# Patient Record
Sex: MALE | Race: BLACK OR AFRICAN AMERICAN | Employment: FULL TIME | URBAN - METROPOLITAN AREA
[De-identification: names, ages, dates, MRNs, and addresses within clinical notes are randomized per-mention and may not be internally consistent; named-entity substitution may affect disease eponyms.]

---

## 2017-09-15 ENCOUNTER — HOSPITAL ENCOUNTER (OUTPATIENT)
Dept: CT IMAGING | Age: 22
Discharge: HOME | End: 2017-09-15
Attending: NEUROLOGICAL SURGERY | Admitting: NEUROLOGICAL SURGERY

## 2017-09-15 DIAGNOSIS — S06.4XAA HEMORRHAGE INTO EXTRADURAL SPACE OF NEURAXIS: ICD-10-CM

## 2017-09-15 DIAGNOSIS — I62.00 NONTRAUMATIC SUBDURAL HEMORRHAGE (HCC): ICD-10-CM

## 2019-05-28 ENCOUNTER — HOSPITAL ENCOUNTER (EMERGENCY)
Age: 24
Discharge: HOME | End: 2019-05-28
Attending: EMERGENCY MEDICINE

## 2019-05-28 VITALS
DIASTOLIC BLOOD PRESSURE: 83 MMHG | BODY MASS INDEX: 20.86 KG/M2 | HEIGHT: 71 IN | RESPIRATION RATE: 16 BRPM | OXYGEN SATURATION: 100 % | TEMPERATURE: 98.4 F | WEIGHT: 149 LBS | HEART RATE: 63 BPM | SYSTOLIC BLOOD PRESSURE: 120 MMHG

## 2019-05-28 DIAGNOSIS — S81.812A LACERATION OF LEFT LOWER EXTREMITY, INITIAL ENCOUNTER: Primary | ICD-10-CM

## 2019-05-28 PROCEDURE — 99282 EMERGENCY DEPT VISIT SF MDM: CPT

## 2019-05-28 PROCEDURE — 6360000002 HC RX W HCPCS: Performed by: EMERGENCY MEDICINE

## 2019-05-28 PROCEDURE — 2500000003 HC RX 250 WO HCPCS: Performed by: EMERGENCY MEDICINE

## 2019-05-28 PROCEDURE — 90715 TDAP VACCINE 7 YRS/> IM: CPT | Performed by: EMERGENCY MEDICINE

## 2019-05-28 PROCEDURE — 90471 IMMUNIZATION ADMIN: CPT | Performed by: EMERGENCY MEDICINE

## 2019-05-28 PROCEDURE — 4500000022 HC ED LEVEL 2 PROCEDURE

## 2019-05-28 RX ORDER — LIDOCAINE HYDROCHLORIDE AND EPINEPHRINE 10; 10 MG/ML; UG/ML
20 INJECTION, SOLUTION INFILTRATION; PERINEURAL ONCE
Status: COMPLETED | OUTPATIENT
Start: 2019-05-28 | End: 2019-05-28

## 2019-05-28 RX ADMIN — TETANUS TOXOID, REDUCED DIPHTHERIA TOXOID AND ACELLULAR PERTUSSIS VACCINE, ADSORBED 0.5 ML: 5; 2.5; 8; 8; 2.5 SUSPENSION INTRAMUSCULAR at 13:11

## 2019-05-28 RX ADMIN — LIDOCAINE HYDROCHLORIDE,EPINEPHRINE BITARTRATE 20 ML: 10; .01 INJECTION, SOLUTION INFILTRATION; PERINEURAL at 13:00

## 2019-05-28 ASSESSMENT — PAIN SCALES - GENERAL: PAINLEVEL_OUTOF10: 0

## 2019-05-28 NOTE — LETTER
[facility] Emergency Department  [facility] [address] [address]  Phone: [phone]  Fax: [phone]         May 28, 2019     Patient: Amilcar Harris   YOB: 1995   Date of Visit: 5/28/2019       To Whom It May Concern:    Amilcar Harris was seen and treated in our emergency department on 5/28/2019. The following work duties are recommended. He may return to work, with wound covered    Treatment and work recommendations given by the emergency department are initial emergency measures only, and follow up should be arranged as soon as possible with the specialist to whom the worker was referred.       Sincerely,        Carline Rangel RN        Signature:__________________________________

## 2019-05-28 NOTE — ED NOTES
Wound irrigated with 500 mL sterile water using pressure. Pt tolerated well. Surrounding area cleansed with chlorhexidine.       Eb King RN  05/28/19 3438

## 2019-05-28 NOTE — ED TRIAGE NOTES
Pt states that he was helping a friend move and was cut by glass. Happened at approx 1130.  Laceration to anterior left lower leg

## 2019-05-28 NOTE — ED NOTES
Patient prepared for and ready to be discharged. Dressed in clothes and given belongings. Patient discharged at this time in no acute distress after he verbalized understanding of discharge instructions. Reviewed medications, and when to return to the ED with patient.  Encouraged follow up with PCP for stitch removal in 10-14 days  Patient walked to emil Bone RN  05/28/19 [id number]

## 2019-05-28 NOTE — ED PROVIDER NOTES
[address]          ATTENDING PHYSICIAN NOTE       Date of evaluation: 5/28/2019    Chief Complaint     Laceration      History of Present Illness     Amilcar Harris is a 20 y.o. male who presents with a laceration to the front of his left shin. The patient was moving a piece of glass when it struck him in the leg. Last tetanus is unknown. But is controlled with a bandage from home. Review of Systems     Review of Systems negative for fevers and chills. Negative for unusual bleeding problems    Past Medical, Surgical, Family, and Social History     He has a past medical history of Asthma. He has no past surgical history on file. His family history is not on file. He reports that he has quit smoking. He has never used smokeless tobacco. He reports that he does not drink alcohol or use drugs. Medications     Previous Medications    No medications on file       Allergies     He has No Known Allergies. Physical Exam     INITIAL VITALS: BP: 120/83, Temp: 98.4 °F (36.9 °C), Pulse: 63, Resp: 16, SpO2: 100 %    Physical Exam  General--patient's alert sitting in bed as her walk in the room  Extremities--patient is a 2.5 sign or laceration to the left anterior tibia approximately mid tibia. Substance tissue was visualized. Bleeding is controlled. Distal neurovascular exam of the left foot is all normal  Diagnostic Results     RADIOLOGY:  No orders to display       LABS:   No results found for this visit on 05/28/19. RECENT VITALS:  BP: 120/83,Temp: 98.4 °F (36.9 °C), Pulse: 63, Resp: 16, SpO2: 100 %     Procedures     Laceration repair--patient's wound was irrigated with normal saline. It was then scrubbed with CHG. A small amount of fatty tissue was too provided. Anesthesia was done with lidocaine 1% with epinephrine.   4 interrupted 4-0 nylon stitches were placed in this 2.5 cm wound    ED Course     Nursing Notes, Past Medical Hx, Past Surgical Hx, Social Hx,Allergies, and Family Hx were reviewed. The patient was given the following medications:  Orders Placed This Encounter   Medications    lidocaine-EPINEPHrine 1 percent-[id number] injection 20 mL    Tetanus-Diphth-Acell Pertussis (BOOSTRIX) injection 0.5 mL       CONSULTS:  None    MEDICAL DECISIONMAKING / ASSESSMENT / Arjun Alexander is a 20 y.o. male central laceration, wound has been repaired, instructions given to the patient, he'll be discharged, tetanus is been updated. Clinical Impression     2.5 similar left lower extremity tibia    Disposition     PATIENT REFERRED TO:  No follow-up provider specified.     DISCHARGE MEDICATIONS:  New Prescriptions    No medications on file       DISPOSITION         Davon Cat MD  05/28/19 0159

## 2019-06-10 ENCOUNTER — HOSPITAL ENCOUNTER (EMERGENCY)
Age: 24
Discharge: HOME | End: 2019-06-10

## 2019-06-10 VITALS
OXYGEN SATURATION: 96 % | RESPIRATION RATE: 15 BRPM | DIASTOLIC BLOOD PRESSURE: 63 MMHG | HEIGHT: 71 IN | TEMPERATURE: 98.6 F | WEIGHT: 149 LBS | BODY MASS INDEX: 20.86 KG/M2 | SYSTOLIC BLOOD PRESSURE: 108 MMHG | HEART RATE: 68 BPM

## 2019-06-10 DIAGNOSIS — Z48.02 VISIT FOR SUTURE REMOVAL: Primary | ICD-10-CM

## 2019-06-10 PROCEDURE — 4500000002 HC ER NO CHARGE

## 2019-06-10 PROCEDURE — 99281 EMR DPT VST MAYX REQ PHY/QHP: CPT

## 2019-06-10 ASSESSMENT — ENCOUNTER SYMPTOMS
COLOR CHANGE: 0
VOMITING: 0
NAUSEA: 0

## 2019-06-10 NOTE — ED NOTES
Patient prepared for and ready to be discharged. Dressed in clothes and given belongings. Patient discharged at this time in no acute distress after he verbalized understanding of discharge instructions. Reviewed medications, and when to return to the ED with patient.  Encouraged follow up with PCP   Patient walked to emil Alaniz RN  06/10/19 [id number]

## 2022-06-24 ENCOUNTER — HOSPITAL ENCOUNTER (EMERGENCY)
Age: 27
Discharge: HOME | End: 2022-06-24
Attending: EMERGENCY MEDICINE

## 2022-06-24 ENCOUNTER — APPOINTMENT (OUTPATIENT)
Dept: GENERAL RADIOLOGY | Age: 27
End: 2022-06-24

## 2022-06-24 VITALS
WEIGHT: 162.19 LBS | RESPIRATION RATE: 16 BRPM | DIASTOLIC BLOOD PRESSURE: 65 MMHG | SYSTOLIC BLOOD PRESSURE: 104 MMHG | BODY MASS INDEX: 22.71 KG/M2 | HEIGHT: 71 IN | HEART RATE: 85 BPM | OXYGEN SATURATION: 97 % | TEMPERATURE: 98.4 F

## 2022-06-24 DIAGNOSIS — S16.1XXA STRAIN OF NECK MUSCLE, INITIAL ENCOUNTER: ICD-10-CM

## 2022-06-24 DIAGNOSIS — S09.90XA CLOSED HEAD INJURY, INITIAL ENCOUNTER: ICD-10-CM

## 2022-06-24 DIAGNOSIS — S01.01XA LACERATION OF SCALP, INITIAL ENCOUNTER: Primary | ICD-10-CM

## 2022-06-24 PROCEDURE — 6370000000 HC RX 637 (ALT 250 FOR IP): Performed by: EMERGENCY MEDICINE

## 2022-06-24 PROCEDURE — 99283 EMERGENCY DEPT VISIT LOW MDM: CPT

## 2022-06-24 PROCEDURE — 12001 RPR S/N/AX/GEN/TRNK 2.5CM/<: CPT

## 2022-06-24 RX ORDER — IBUPROFEN 600 MG/1
600 TABLET ORAL ONCE
Status: COMPLETED | OUTPATIENT
Start: 2022-06-24 | End: 2022-06-24

## 2022-06-24 RX ORDER — METHOCARBAMOL 750 MG/1
750 TABLET, FILM COATED ORAL 3 TIMES DAILY PRN
Qty: 30 TABLET | Refills: 0 | Status: SHIPPED | OUTPATIENT
Start: 2022-06-24 | End: 2022-07-04

## 2022-06-24 RX ORDER — IBUPROFEN 600 MG/1
600 TABLET ORAL EVERY 8 HOURS PRN
Qty: 20 TABLET | Refills: 0 | Status: SHIPPED | OUTPATIENT
Start: 2022-06-24

## 2022-06-24 RX ADMIN — IBUPROFEN 600 MG: 600 TABLET, FILM COATED ORAL at 17:51

## 2022-06-24 ASSESSMENT — PAIN DESCRIPTION - LOCATION: LOCATION: HEAD

## 2022-06-24 ASSESSMENT — PAIN SCALES - GENERAL: PAINLEVEL_OUTOF10: 8

## 2022-06-24 ASSESSMENT — PAIN - FUNCTIONAL ASSESSMENT: PAIN_FUNCTIONAL_ASSESSMENT: 0-10

## 2022-06-24 NOTE — ED TRIAGE NOTES
Pt with laceration to scalp approx 45 minutes ago. Hit head against a window sill. Denies LOC. Also c/o right sided neck pain.

## 2022-06-24 NOTE — ED NOTES
Ice to head laceration.      Candelaria Redmond, [facility]  06/24/22 [id number]

## 2022-06-26 NOTE — ED PROVIDER NOTES
TRIAGE CHIEF COMPLAINT:   Chief Complaint   Patient presents with    Head Laceration    Neck Pain         HPI: Jesusita Hardwick is a 27 y.o. male who presents to the Emergency Department with complaint of laceration of his frontal scalp that occurred when he fell hitting his upper scalp on the edge of a windowsill. No loss of consciousness. There was minimal bleeding. He complains of some soreness in the right side of his neck. This occurred an hour ago. No nausea or vomiting. Denies dizziness or vertigo. No numbness or weakness. Denies any other injury. REVIEW OF SYSTEMS:  6 systems reviewed. Pertinent positives per HPI. Otherwise noted to be negative. Nursing notes reviewed and agree with above. Past medical/surgical history reviewed. MEDICATIONS   Discharge Medication List as of 6/24/2022  5:47 PM      START taking these medications    Details   ibuprofen (IBU) 600 MG tablet Take 1 tablet by mouth every 8 hours as needed for Pain or Fever (with food), Disp-20 tablet, R-0Normal      methocarbamol (ROBAXIN-750) 750 MG tablet Take 1 tablet by mouth 3 times daily as needed (muscle soreness or spasm), Disp-30 tablet, R-0Normal               ALLERGIES No Known Allergies      /65   Pulse 85   Temp 98.4 °F (36.9 °C) (Oral)   Resp 16   Ht 5' 11\" (1.803 m)   Wt 162 lb 3 oz (73.6 kg)   SpO2 97%   BMI 22.62 kg/m²   General:  No acute distress. Non toxic appearance  Head:   1.5 cm superficial laceration in the frontal scalp. No active bleeding. No surrounding swelling. No other scalp or facial injury. Eyes:   Conjunctiva clear, PATRICIA, EOM's intact. Sclera anicteric. ENT:   Mucous membranes moist.  TMs are normal.  Nose is clear. No nasal tenderness. Oropharynx is clear without injury to the teeth or mandible. Neck:   Supple. No adenopathy. No bony tenderness. Mild right-sided paracervical tenderness to palpation.   Lungs/Chest:  No respiratory distress  CVS:   Regular rate and rhythm  Extremities:  Full range of motion  Skin:   No rashes or lesions to exposed skin  Neuro:  Alert and OX3. Speech clear and appropriate. No extremity weakness. Normal sensation in all extremities. No facial asymmetry. Gait normal.  Psych:   Affect normal. Mood normal        RADIOLOGY:      LAB      PROCEDURES:   The patient verbally consented to cleaning of the laceration and repair using staples. 2 staples were placed in the laceration without anesthesia and the patient tolerated this well. ED COURSE / MDM:  78-year-old male with superficial laceration to the frontal scalp when he tripped and fell hitting a windowsill. He complains of some mild right-sided neck pain. He is neurologically intact and hemodynamically stable. Tetanus is up-to-date. The patient declined x-rays of the cervical spine. Laceration was closed using 2 staples which the patient tolerated well. He was given head injury, cervical strain and laceration instructions. Recommended ibuprofen for pain and Robaxin for muscle tightness. Advise follow-up with primary care. I discussed with Padma Patel the results of evaluation in the Emergency Department, diagnosis, care and prognosis. The plan is to discharge to home. The patient is in agreement with the plan and questions have been answered. I also discussed with the patient and/or family the reasons which may require a return visit and the importance of follow-up care.        (Please note that portions of this note may have been completed with a voice recognition program.  Efforts were made to edit the dictation but occasionally words are mis-transcribed)        FINAL IMPRESSION:  1 --laceration of scalp, 1.5 cm, simple repair  2 --closed head injury  3 --cervical strain           Graeme José MD  06/26/22 [id number]

## 2022-07-06 ENCOUNTER — HOSPITAL ENCOUNTER (EMERGENCY)
Age: 27
Discharge: HOME | End: 2022-07-06
Attending: EMERGENCY MEDICINE

## 2022-07-06 VITALS
OXYGEN SATURATION: 99 % | TEMPERATURE: 98.3 F | WEIGHT: 162.8 LBS | SYSTOLIC BLOOD PRESSURE: 106 MMHG | HEART RATE: 65 BPM | HEIGHT: 71 IN | BODY MASS INDEX: 22.79 KG/M2 | DIASTOLIC BLOOD PRESSURE: 66 MMHG | RESPIRATION RATE: 10 BRPM

## 2022-07-06 DIAGNOSIS — Z48.02 ENCOUNTER FOR STAPLE REMOVAL: Primary | ICD-10-CM

## 2022-07-06 PROCEDURE — 99282 EMERGENCY DEPT VISIT SF MDM: CPT

## 2022-07-06 ASSESSMENT — PAIN - FUNCTIONAL ASSESSMENT
PAIN_FUNCTIONAL_ASSESSMENT: NONE - DENIES PAIN
PAIN_FUNCTIONAL_ASSESSMENT: NONE - DENIES PAIN

## 2022-07-06 ASSESSMENT — ENCOUNTER SYMPTOMS: COLOR CHANGE: 0

## 2022-07-06 NOTE — ED NOTES
Patient given  discharge instructions verbal and written, patient verbalized understanding. Alert/oriented X4, Clear speech.   Patient exhibits no distress, ambulates with steady gait per self leaving unit, no further request.     Philomena Last RN  07/06/22 3656

## 2022-07-06 NOTE — ED PROVIDER NOTES
CHIEF COMPLAINT  Suture / Staple Removal (top of head)      HISTORY OF PRESENT ILLNESS  Trenton Mccarthy is a 27 y.o. male presenting for staple removal.  He had a scalp laceration that was repaired several weeks ago. He has no complaints at this time. No signs of wound infection per his report. No other complaints, modifying factors or associated symptoms. I have reviewed the following from the nursing documentation. Past Medical History:   Diagnosis Date    Asthma      History reviewed. No pertinent surgical history. History reviewed. No pertinent family history. Social History     Socioeconomic History    Marital status: Single     Spouse name: Not on file    Number of children: Not on file    Years of education: Not on file    Highest education level: Not on file   Occupational History    Not on file   Tobacco Use    Smoking status: Former Smoker    Smokeless tobacco: Never Used   Vaping Use    Vaping Use: Never used   Substance and Sexual Activity    Alcohol use: No    Drug use: No    Sexual activity: Not on file   Other Topics Concern    Not on file   Social History Narrative    Not on file     Social Determinants of Health     Financial Resource Strain:     Difficulty of Paying Living Expenses: Not on file   Food Insecurity:     Worried About [address] in the Last Year: Not on file    [address] in the Last Year: Not on file   Transportation Needs:     Lack of Transportation (Medical): Not on file    Lack of Transportation (Non-Medical):  Not on file   Physical Activity:     Days of Exercise per Week: Not on file    Minutes of Exercise per Session: Not on file   Stress:     Feeling of Stress : Not on file   Social Connections:     Frequency of Communication with Friends and Family: Not on file    Frequency of Social Gatherings with Friends and Family: Not on file    Attends Restoration Services: Not on file    Active Member of Clubs or Organizations: Not on file  Attends Club or Organization Meetings: Not on file    Marital Status: Not on file   Intimate Partner Violence:     Fear of Current or Ex-Partner: Not on file    Emotionally Abused: Not on file    Physically Abused: Not on file    Sexually Abused: Not on file   Housing Stability:     Unable to Pay for Housing in the Last Year: Not on file    Number of Jez in the Last Year: Not on file    Unstable Housing in the Last Year: Not on file     No current facility-administered medications for this encounter. Current Outpatient Medications   Medication Sig Dispense Refill    ibuprofen (IBU) 600 MG tablet Take 1 tablet by mouth every 8 hours as needed for Pain or Fever (with food) (Patient not taking: Reported on 7/6/2022) 20 tablet 0     No Known Allergies    REVIEW OF SYSTEMS  Review of Systems   Constitutional: Negative for fever. HENT: Positive for drooling. Skin: Positive for wound. Negative for color change and pallor. PHYSICAL EXAM  /66   Pulse 65   Temp 98.3 °F (36.8 °C) (Oral)   Resp 10   Ht 5' 11\" (1.803 m)   Wt 162 lb 12.8 oz (73.8 kg)   SpO2 99%   BMI 22.71 kg/m²   GENERAL APPEARANCE: Awake and alert. Cooperative. HEAD: Normocephalic. Well-healed scalp laceration with 2 staples no erythema, drainage or swelling. EYES: PERRL. EOM's grossly intact. LUNGS: Respirations unlabored without accessory muscle use. SKIN: Warm and dry. No acute rashes. NEUROLOGICAL: Alert and oriented X 3. No focal deficits    LABS  I have reviewed all labs for this visit. No results found for this visit on 07/06/22. RADIOLOGY  X-RAYS:  I have reviewed radiologic plain film image(s). ALL OTHER NON-PLAIN FILM IMAGES SUCH AS CT, ULTRASOUND AND MRI HAVE BEEN READ BY THE RADIOLOGIST. No orders to display          No results found. During the patient's ED course, the patient was given:  Medications - No data to display     ED COURSE/MDM  Patient seen and evaluated. 2 staples removed, no signs of wound infection. Patient counseled on return precautions. The patient will be discharged from the emergency department. The patient was counseled on their diagnosis and any medications prescribed. They were advised on the need for PCP followup. They were counseled on the need to return to the emergency department if any of their symptoms were to worsen, change or have any other concerns. Discharged in stable condition. CLINICAL IMPRESSION  1. Encounter for staple removal        Blood pressure 106/66, pulse 65, temperature 98.3 °F (36.8 °C), temperature source Oral, resp. rate 10, height 5' 11\" (1.803 m), weight 162 lb 12.8 oz (73.8 kg), SpO2 99 %. DISPOSITION  Vera Ely was discharged to homed in stable condition. This chart was generated in part by using Dragon Dictation system and may contain errors related to that system including errors in grammar, punctuation, and spelling, as well as words and phrases that may be inappropriate. If there are any questions or concerns please feel free to contact the dictating provider for clarification.      Migdalia Blake MD  07/06/22 [address], [place]  07/08/22 6156

## 2022-07-06 NOTE — Clinical Note
Jailyn Devries was seen and treated in our emergency department on 7/6/2022. He may return to work on 07/07/2022. If you have any questions or concerns, please don't hesitate to call.       Anabelle Forbes MD